# Patient Record
Sex: FEMALE | Race: WHITE | Employment: OTHER | RURAL
[De-identification: names, ages, dates, MRNs, and addresses within clinical notes are randomized per-mention and may not be internally consistent; named-entity substitution may affect disease eponyms.]

---

## 2017-03-01 ENCOUNTER — OFFICE VISIT (OUTPATIENT)
Dept: FAMILY MEDICINE CLINIC | Age: 52
End: 2017-03-01

## 2017-03-01 VITALS
DIASTOLIC BLOOD PRESSURE: 76 MMHG | RESPIRATION RATE: 16 BRPM | BODY MASS INDEX: 29.51 KG/M2 | SYSTOLIC BLOOD PRESSURE: 127 MMHG | HEIGHT: 67 IN | HEART RATE: 79 BPM | OXYGEN SATURATION: 99 % | TEMPERATURE: 97.6 F | WEIGHT: 188 LBS

## 2017-03-01 DIAGNOSIS — B37.31 VAGINAL YEAST INFECTION: ICD-10-CM

## 2017-03-01 DIAGNOSIS — K59.00 CONSTIPATION, UNSPECIFIED CONSTIPATION TYPE: ICD-10-CM

## 2017-03-01 DIAGNOSIS — N89.8 VAGINAL ITCHING: ICD-10-CM

## 2017-03-01 DIAGNOSIS — R10.84 DIFFUSE ABDOMINAL PAIN: Primary | ICD-10-CM

## 2017-03-01 DIAGNOSIS — R52 BODY ACHES: ICD-10-CM

## 2017-03-01 DIAGNOSIS — R53.83 FATIGUE, UNSPECIFIED TYPE: ICD-10-CM

## 2017-03-01 DIAGNOSIS — R68.83 CHILLS (WITHOUT FEVER): ICD-10-CM

## 2017-03-01 DIAGNOSIS — R11.0 NAUSEA WITHOUT VOMITING: ICD-10-CM

## 2017-03-01 LAB
BILIRUB UR QL STRIP: NEGATIVE
GLUCOSE UR-MCNC: NEGATIVE MG/DL
KETONES P FAST UR STRIP-MCNC: NEGATIVE MG/DL
PH UR STRIP: 6 [PH] (ref 4.6–8)
PROT UR QL STRIP: NEGATIVE MG/DL
QUICKVUE INFLUENZA TEST: NEGATIVE
SP GR UR STRIP: 1.01 (ref 1–1.03)
UA UROBILINOGEN AMB POC: NORMAL (ref 0.2–1)
URINALYSIS CLARITY POC: CLEAR
URINALYSIS COLOR POC: YELLOW
URINE BLOOD POC: NEGATIVE
URINE LEUKOCYTES POC: NEGATIVE
URINE NITRITES POC: NEGATIVE
VALID INTERNAL CONTROL?: YES

## 2017-03-01 RX ORDER — FLUCONAZOLE 150 MG/1
150 TABLET ORAL DAILY
Qty: 1 TAB | Refills: 1 | Status: SHIPPED | OUTPATIENT
Start: 2017-03-01 | End: 2017-03-02

## 2017-03-01 NOTE — PROGRESS NOTES
Health Maintenance Due   Topic Date Due    Hepatitis C Screening  1965  Patient has not had this done, is willing to do    Pneumococcal 19-64 Medium Risk (1 of 1 - PPSV23)  6/14/1982   Patient has not had this done, wants to know if she really needs it    DTaP/Tdap/Td series (1 - Tdap) 06/14/1984  Patient will RTC for this when it is needed    PAP AKA CERVICAL CYTOLOGY  06/14/1984  Patient reports she had this done last year, with Rambo Araujo at [facility], will request records    [facility] MAMMOGRAM  06/14/2013  Patient reports she had this done within the last year at the [facility]    FOBT Q 1 YEAR AGE 50-75  06/14/2013  I have given patient the kit to take home and explained how to collect the specimen    INFLUENZA AGE 9 TO ADULT  08/01/2016  Patient reports she had this done at the [facility], request for records will be sent     Lani Denney RN

## 2017-03-01 NOTE — PROGRESS NOTES
Debbie Cummings is a 46 y.o. female who presents to the office today with the following:  Chief Complaint   Patient presents with    Abdominal Pain     lower abdomin pain, ? UTI       HPI  Suspects UTI vs yeast inf, or \"maybe the flu?\"   Stating \"I don't know I just don't feel good. \"  C/o vaginal itching w/o discharge or lesions x 2 days. Has had yeast inf before, sxs same. No STD concerns. Has been tx with otc Monistat, has helped a little for the itching but would like diff tx. She denies burning with urination, urgency, frequency, or hematuria. Says it only burned slightly with Monistat application. Also with some mild diffuse (slightly worse lower) abdominal discomfort, no worsening, constant. Reports some bilat lower back pain as well. Also with nausea w/o vomiting, some fatigue, a little achy all over. No fevers. Denies any URI sxs. No ST, cough, congestion, ear pain. Also denies any vomiting or diarrhea, but admits to some constipation that is new for her. Last BM was this morning, but was small amt of hard stool. No blood or mucus. Appetite wnl. Just returned from [place].   Did have flu shot. Brother is sick. Reports nl colonoscopy 3 yrs ago. Review of Systems   Constitutional: Positive for chills and malaise/fatigue. Negative for fever. HENT: Negative for ear pain and sore throat. Respiratory: Negative for cough and shortness of breath. Cardiovascular: Negative for chest pain. Gastrointestinal: Positive for abdominal pain and nausea. Negative for diarrhea and vomiting. Genitourinary: Positive for dysuria and flank pain. Negative for frequency, hematuria and urgency. Musculoskeletal: Negative for myalgias. Skin: Negative for rash. Neurological: Negative for headaches.        Allergies   Allergen Reactions    Augmentin [Amoxicillin-Pot Clavulanate] Nausea Only    Dilaudid [Hydromorphone (Bulk)] Other (comments)     Severe Headache    Keflex [Cephalexin] Nausea and Vomiting       Current Outpatient Prescriptions   Medication Sig    fluconazole (DIFLUCAN) 150 mg tablet Take 1 Tab by mouth daily for 1 day. FDA advises cautious prescribing of oral fluconazole in pregnancy. No current facility-administered medications for this visit. Past Medical History:   Diagnosis Date            No past surgical history on file. Social History     Social History    Marital status:      Spouse name: N/A    Number of children: N/A    Years of education: N/A     Social History Main Topics    Smoking status: Current Every Day Smoker     Packs/day: 0.25     Years: 10.00    Smokeless tobacco: Never Used    Alcohol use 1.0 oz/week     2 Glasses of wine per week    Drug use: No    Sexual activity: Yes     Partners: Female     Other Topics Concern    None     Social History Narrative       Family History   Problem Relation Age of Onset    Heart Disease Father     Hypertension Father     Cancer Father      skin cancer & breast cancer    Diabetes Father     Diabetes Maternal Grandmother     Cancer Mother          Physical Exam:  Visit Vitals    /76 (BP 1 Location: Left arm, BP Patient Position: Sitting)    Pulse 79    Temp 97.6 °F (36.4 °C) (Oral)    Resp 16    Ht 5' 7\" (1.702 m)    Wt 188 lb (85.3 kg)    SpO2 99%    BMI 29.44 kg/m2     Physical Exam   Constitutional: She is oriented to person, place, and time and well-developed, well-nourished, and in no distress. HENT:   Head: Normocephalic and atraumatic. Right Ear: External ear normal.   Left Ear: External ear normal.   Nose: Nose normal.   Mouth/Throat: Oropharynx is clear and moist.   Eyes: Conjunctivae and EOM are normal.   Neck: Normal range of motion. Neck supple. Cardiovascular: Normal rate, regular rhythm, normal heart sounds and intact distal pulses. Pulmonary/Chest: Effort normal and breath sounds normal. No respiratory distress. She has no decreased breath sounds. She has no wheezes. She has no rhonchi. She has no rales. Abdominal: Soft. Normal appearance. There is no hepatosplenomegaly. There is generalized tenderness. There is no rigidity, no rebound, no guarding and no CVA tenderness. Neg foot tap. No pain with passive LE manipulation. BS maybe just a little hypoactive. Musculoskeletal: Normal range of motion. Lymphadenopathy:     She has no cervical adenopathy. Neurological: She is alert and oriented to person, place, and time. Gait normal.   Skin: Skin is warm and dry. Psychiatric: Mood and affect normal.   Nursing note and vitals reviewed. Assessment/Plan:    ICD-10-CM ICD-9-CM    1. Diffuse abdominal pain R10.84 789.00 AMB POC URINALYSIS DIP STICK MANUAL W/O MICRO      CBC WITH AUTOMATED DIFF      METABOLIC PANEL, COMPREHENSIVE   2. Constipation, unspecified constipation type K59.00 564.00 - Rec fluids and Miralax, high fiber diet. - Suspect viral vs constipation as diffuse abd discomfort. 3. Fatigue, unspecified type R53.83 780.79 AMB POC RAPID INFLUENZA TEST   4. Nausea without vomiting R11.0 787.02 AMB POC RAPID INFLUENZA TEST   5. Chills (without fever) R68.83 780.64 AMB POC RAPID INFLUENZA TEST   6. Body aches R52 780.96 AMB POC RAPID INFLUENZA TEST   7. Vaginal itching L29.8 698.1 CULTURE, URINE      fluconazole (DIFLUCAN) 150 mg tablet   8. Vaginal yeast infection B37.3 112.1 fluconazole (DIFLUCAN) 150 mg tablet  - Defers today, but agrees to RTC for full pelvic exam if sxs persist/worsen.      Results for orders placed or performed in visit on 03/01/17   AMB POC URINALYSIS DIP STICK MANUAL W/O MICRO   Result Value Ref Range    Color (UA POC) Yellow     Clarity (UA POC) Clear     Glucose (UA POC) Negative Negative    Bilirubin (UA POC) Negative Negative    Ketones (UA POC) Negative Negative    Specific gravity (UA POC) 1.010 1.001 - 1.035    Blood (UA POC) Negative Negative    pH (UA POC) 6.0 4.6 - 8.0    Protein (UA POC) Negative Negative mg/dL    Urobilinogen (UA POC) 0.2 mg/dL 0.2 - 1    Nitrites (UA POC) Negative Negative    Leukocyte esterase (UA POC) Negative Negative   AMB POC RAPID INFLUENZA TEST   Result Value Ref Range    VALID INTERNAL CONTROL POC Yes     QuickVue Influenza test Negative Negative     Explained to pt suspect viral etiology + constipation. Urine culture also pending and will cover for yeast inf. Discussed plan with Dr. Freedom Levine who agrees. Counseled pt on \"red flag\" sxs and instructed to seek immediate med attn/to ER if any such sxs/concerns (eg severe/worsening abd pain, fever, vomiting, cp, sob). Pt verbalizes understanding and agrees with the plan. Will call with lab results. Follow-up Disposition:  Return if symptoms worsen or fail to improve.     Leotha Claude, PA-C

## 2017-03-01 NOTE — PATIENT INSTRUCTIONS
Abdominal Pain: Care Instructions  Your Care Instructions    Abdominal pain has many possible causes. Some aren't serious and get better on their own in a few days. Others need more testing and treatment. If your pain continues or gets worse, you need to be rechecked and may need more tests to find out what is wrong. You may need surgery to correct the problem. Don't ignore new symptoms, such as fever, nausea and vomiting, urination problems, pain that gets worse, and dizziness. These may be signs of a more serious problem. Your doctor may have recommended a follow-up visit in the next 8 to 12 hours. If you are not getting better, you may need more tests or treatment. The doctor has checked you carefully, but problems can develop later. If you notice any problems or new symptoms, get medical treatment right away. Follow-up care is a key part of your treatment and safety. Be sure to make and go to all appointments, and call your doctor if you are having problems. It's also a good idea to know your test results and keep a list of the medicines you take. How can you care for yourself at home? · Rest until you feel better. · To prevent dehydration, drink plenty of fluids, enough so that your urine is light yellow or clear like water. Choose water and other caffeine-free clear liquids until you feel better. If you have kidney, heart, or liver disease and have to limit fluids, talk with your doctor before you increase the amount of fluids you drink. · If your stomach is upset, eat mild foods, such as rice, dry toast or crackers, bananas, and applesauce. Try eating several small meals instead of two or three large ones. · Wait until 48 hours after all symptoms have gone away before you have spicy foods, alcohol, and drinks that contain caffeine. · Do not eat foods that are high in fat. · Avoid anti-inflammatory medicines such as aspirin, ibuprofen (Advil, Motrin), and naproxen (Aleve).  These can cause stomach upset. Talk to your doctor if you take daily aspirin for another health problem. When should you call for help? Call 911 anytime you think you may need emergency care. For example, call if:  · You passed out (lost consciousness). · You pass maroon or very bloody stools. · You vomit blood or what looks like coffee grounds. · You have new, severe belly pain. Call your doctor now or seek immediate medical care if:  · Your pain gets worse, especially if it becomes focused in one area of your belly. · You have a new or higher fever. · Your stools are black and look like tar, or they have streaks of blood. · You have unexpected vaginal bleeding. · You have symptoms of a urinary tract infection. These may include:  ¨ Pain when you urinate. ¨ Urinating more often than usual.  ¨ Blood in your urine. · You are dizzy or lightheaded, or you feel like you may faint. Watch closely for changes in your health, and be sure to contact your doctor if:  · You are not getting better after 1 day (24 hours). Where can you learn more? Go to http://[facility]I Physical Therapyrolo.info/. Enter Y577 in the search box to learn more about \"Abdominal Pain: Care Instructions. \"  Current as of: May 27, 2016  Content Version: 11.1  © 6141-6161 [employer]. Care instructions adapted under license by [employer] (which disclaims liability or warranty for this information). If you have questions about a medical condition or this instruction, always ask your healthcare professional. [place] [address] any warranty or liability for your use of this information. Constipation: Care Instructions  Your Care Instructions  Constipation means that you have a hard time passing stools (bowel movements). People pass stools from 3 times a day to once every 3 days. What is normal for you may be different. Constipation may occur with pain in the rectum and cramping.  The pain may get worse when you try to pass stools. Sometimes there are small amounts of bright red blood on toilet paper or the surface of stools. This is because of enlarged veins near the rectum (hemorrhoids). A few changes in your diet and lifestyle may help you avoid ongoing constipation. Your doctor may also prescribe medicine to help loosen your stool. Some medicines can cause constipation. These include pain medicines and antidepressants. Tell your doctor about all the medicines you take. Your doctor may want to make a medicine change to ease your symptoms. Follow-up care is a key part of your treatment and safety. Be sure to make and go to all appointments, and call your doctor if you are having problems. It's also a good idea to know your test results and keep a list of the medicines you take. How can you care for yourself at home? · Drink plenty of fluids, enough so that your urine is light yellow or clear like water. If you have kidney, heart, or liver disease and have to limit fluids, talk with your doctor before you increase the amount of fluids you drink. · Include high-fiber foods in your diet each day. These include fruits, vegetables, beans, and whole grains. · Get at least 30 minutes of exercise on most days of the week. Walking is a good choice. You also may want to do other activities, such as running, swimming, cycling, or playing tennis or team sports. · Take a fiber supplement, such as Citrucel or Metamucil, every day. Read and follow all instructions on the label. · Schedule time each day for a bowel movement. A daily routine may help. Take your time having your bowel movement. · Support your feet with a small step stool when you sit on the toilet. This helps flex your hips and places your pelvis in a squatting position. · Your doctor may recommend an over-the-counter laxative to relieve your constipation. Examples are Milk of Magnesia and MiraLax. Read and follow all instructions on the label.  Do not use laxatives on a long-term basis. When should you call for help? Call your doctor now or seek immediate medical care if:  · You have new or worse belly pain. · You have new or worse nausea or vomiting. · You have blood in your stools. Watch closely for changes in your health, and be sure to contact your doctor if:  · Your constipation is getting worse. · You do not get better as expected. Where can you learn more? Go to http://javy-rolo.info/. Enter [phone] in the search box to learn more about \"Constipation: Care Instructions. \"  Current as of: May 27, 2016  Content Version: 11.1  © 2187-7716 [employer]. Care instructions adapted under license by [employer] (which disclaims liability or warranty for this information). If you have questions about a medical condition or this instruction, always ask your healthcare professional. [place] [address] any warranty or liability for your use of this information. Nausea and Vomiting: Care Instructions  Your Care Instructions    When you are nauseated, you may feel weak and sweaty and notice a lot of saliva in your mouth. Nausea often leads to vomiting. Most of the time you do not need to worry about nausea and vomiting, but they can be signs of other illnesses. Two common causes of nausea and vomiting are stomach flu and food poisoning. Nausea and vomiting from viral stomach flu will usually start to improve within 24 hours. Nausea and vomiting from food poisoning may last from 12 to 48 hours. The doctor has checked you carefully, but problems can develop later. If you notice any problems or new symptoms, get medical treatment right away. Follow-up care is a key part of your treatment and safety. Be sure to make and go to all appointments, and call your doctor if you are having problems. It's also a good idea to know your test results and keep a list of the medicines you take.   How can you care for yourself at home? · To prevent dehydration, drink plenty of fluids, enough so that your urine is light yellow or clear like water. Choose water and other caffeine-free clear liquids until you feel better. If you have kidney, heart, or liver disease and have to limit fluids, talk with your doctor before you increase the amount of fluids you drink. · Rest in bed until you feel better. · When you are able to eat, try clear soups, mild foods, and liquids until all symptoms are gone for 12 to 48 hours. Other good choices include dry toast, crackers, cooked cereal, and gelatin dessert, such as Jell-O. When should you call for help? Call 911 anytime you think you may need emergency care. For example, call if:  · You passed out (lost consciousness). Call your doctor now or seek immediate medical care if:  · You have symptoms of dehydration, such as:  ¨ Dry eyes and a dry mouth. ¨ Passing only a little dark urine. ¨ Feeling thirstier than usual.  · You have new or worsening belly pain. · You have a new or higher fever. · You vomit blood or what looks like coffee grounds. Watch closely for changes in your health, and be sure to contact your doctor if:  · You have ongoing nausea and vomiting. · Your vomiting is getting worse. · Your vomiting lasts longer than 2 days. · You are not getting better as expected. Where can you learn more? Go to http://javy-rolo.info/. Enter 25 [id number] in the search box to learn more about \"Nausea and Vomiting: Care Instructions. \"  Current as of: May 27, 2016  Content Version: 11.1  © 8829-2385 [employer]. Care instructions adapted under license by [employer] (which disclaims liability or warranty for this information). If you have questions about a medical condition or this instruction, always ask your healthcare professional. [address] any warranty or liability for your use of this information.

## 2017-03-02 LAB
ALBUMIN SERPL-MCNC: 4.4 G/DL (ref 3.5–5.5)
ALBUMIN/GLOB SERPL: 1.8 {RATIO} (ref 1.1–2.5)
ALP SERPL-CCNC: 67 IU/L (ref 39–117)
ALT SERPL-CCNC: 11 IU/L (ref 0–32)
AST SERPL-CCNC: 12 IU/L (ref 0–40)
BASOPHILS # BLD AUTO: 0 X10E3/UL (ref 0–0.2)
BASOPHILS NFR BLD AUTO: 0 %
BILIRUB SERPL-MCNC: 0.3 MG/DL (ref 0–1.2)
BUN SERPL-MCNC: 17 MG/DL (ref 6–24)
BUN/CREAT SERPL: 16 (ref 9–23)
CALCIUM SERPL-MCNC: 9.3 MG/DL (ref 8.7–10.2)
CHLORIDE SERPL-SCNC: 103 MMOL/L (ref 96–106)
CO2 SERPL-SCNC: 27 MMOL/L (ref 18–29)
CREAT SERPL-MCNC: 1.07 MG/DL (ref 0.57–1)
EOSINOPHIL # BLD AUTO: 0.2 X10E3/UL (ref 0–0.4)
EOSINOPHIL NFR BLD AUTO: 2 %
ERYTHROCYTE [DISTWIDTH] IN BLOOD BY AUTOMATED COUNT: 13.6 % (ref 12.3–15.4)
GLOBULIN SER CALC-MCNC: 2.5 G/DL (ref 1.5–4.5)
GLUCOSE SERPL-MCNC: 50 MG/DL (ref 65–99)
HCT VFR BLD AUTO: 37.3 % (ref 34–46.6)
HGB BLD-MCNC: 12.4 G/DL (ref 11.1–15.9)
IMM GRANULOCYTES # BLD: 0 X10E3/UL (ref 0–0.1)
IMM GRANULOCYTES NFR BLD: 0 %
INTERPRETATION: NORMAL
LYMPHOCYTES # BLD AUTO: 2 X10E3/UL (ref 0.7–3.1)
LYMPHOCYTES NFR BLD AUTO: 28 %
MCH RBC QN AUTO: 28.7 PG (ref 26.6–33)
MCHC RBC AUTO-ENTMCNC: 33.2 G/DL (ref 31.5–35.7)
MCV RBC AUTO: 86 FL (ref 79–97)
MONOCYTES # BLD AUTO: 0.8 X10E3/UL (ref 0.1–0.9)
MONOCYTES NFR BLD AUTO: 12 %
NEUTROPHILS # BLD AUTO: 4.1 X10E3/UL (ref 1.4–7)
NEUTROPHILS NFR BLD AUTO: 58 %
PLATELET # BLD AUTO: 261 X10E3/UL (ref 150–379)
POTASSIUM SERPL-SCNC: 5.4 MMOL/L (ref 3.5–5.2)
PROT SERPL-MCNC: 6.9 G/DL (ref 6–8.5)
RBC # BLD AUTO: 4.32 X10E6/UL (ref 3.77–5.28)
SODIUM SERPL-SCNC: 144 MMOL/L (ref 134–144)
WBC # BLD AUTO: 7 X10E3/UL (ref 3.4–10.8)

## 2017-03-02 NOTE — PROGRESS NOTES
Pt notified of results, she reports feeling better today. Explained some borderline levels likely off due to pt not eating/drinking much yesterday. (disc with Dr. Keke Sanders who agrees)  Rec f/u with PCP for routine labs when well. May rtc prn.

## 2017-03-03 LAB — BACTERIA UR CULT: NO GROWTH

## 2017-04-04 ENCOUNTER — OFFICE VISIT (OUTPATIENT)
Dept: FAMILY MEDICINE CLINIC | Age: 52
End: 2017-04-04

## 2017-04-04 VITALS
OXYGEN SATURATION: 97 % | BODY MASS INDEX: 29.26 KG/M2 | TEMPERATURE: 97.7 F | RESPIRATION RATE: 14 BRPM | WEIGHT: 186.4 LBS | HEART RATE: 70 BPM | DIASTOLIC BLOOD PRESSURE: 77 MMHG | SYSTOLIC BLOOD PRESSURE: 118 MMHG | HEIGHT: 67 IN

## 2017-04-04 DIAGNOSIS — J06.9 URI WITH COUGH AND CONGESTION: Primary | ICD-10-CM

## 2017-04-04 DIAGNOSIS — J02.9 SORE THROAT: ICD-10-CM

## 2017-04-04 DIAGNOSIS — J04.0 LARYNGITIS: ICD-10-CM

## 2017-04-04 LAB
S PYO AG THROAT QL: NEGATIVE
VALID INTERNAL CONTROL?: YES

## 2017-04-04 RX ORDER — AZITHROMYCIN 250 MG/1
TABLET, FILM COATED ORAL
Qty: 6 TAB | Refills: 0 | Status: SHIPPED | OUTPATIENT
Start: 2017-04-04 | End: 2018-12-06 | Stop reason: ALTCHOICE

## 2017-04-04 NOTE — PROGRESS NOTES
Alejandrina Fung is a 46 y.o. female who presents to the office today with the following:  Chief Complaint   Patient presents with    Laryngitis     x 5 days, frequent head ache       HPI  Hoarse voice x 5 days. Only occasional mild ST \"just doesn't feel right\"  Coughed some phlegm this morning, grey this morn, bright yellow yesterday. Also with mild headache in the back, no neck pain/stiffness. Sneezing some, head congestion. No sinus pain/pressure, no ear pain. Has been taking allergy meds daily, Nasacort ns, mucinex and otc cough syrup. Warm salt water gargles and drinking hot tea with honey. Denies yelling/screaming prior to losing voice. No heartburn/reflux. Sick contacts at home and work. Has had flu shot. Review of Systems   Constitutional: Negative for chills, fever and malaise/fatigue. HENT: Positive for congestion and sore throat. Negative for ear pain. Eyes: Negative. Respiratory: Positive for cough and sputum production. Negative for shortness of breath and wheezing. Cardiovascular: Negative for chest pain. Gastrointestinal: Negative for abdominal pain, diarrhea, nausea and vomiting. Genitourinary: Negative. Musculoskeletal: Negative for myalgias. Skin: Negative for rash. Neurological: Positive for headaches. See HPI. Allergies   Allergen Reactions    Augmentin [Amoxicillin-Pot Clavulanate] Nausea Only    Dilaudid [Hydromorphone (Bulk)] Other (comments)     Severe Headache    Keflex [Cephalexin] Nausea and Vomiting       Current Outpatient Prescriptions   Medication Sig    azithromycin (ZITHROMAX) 250 mg tablet Take 2 tablets today, then take 1 tablet daily     No current facility-administered medications for this visit. Past Medical History:   Diagnosis Date            No past surgical history on file.     Social History     Social History    Marital status:      Spouse name: N/A    Number of children: N/A    Years of education: N/A Social History Main Topics    Smoking status: Current Every Day Smoker     Packs/day: 0.25     Years: 10.00    Smokeless tobacco: Never Used    Alcohol use 1.0 oz/week     2 Glasses of wine per week    Drug use: No    Sexual activity: Yes     Partners: Female     Other Topics Concern    None     Social History Narrative       Family History   Problem Relation Age of Onset    Heart Disease Father     Hypertension Father     Cancer Father      skin cancer & breast cancer    Diabetes Father     Diabetes Maternal Grandmother     Cancer Mother          Physical Exam:  Visit Vitals    /77 (BP 1 Location: Left arm, BP Patient Position: Sitting)    Pulse 70    Temp 97.7 °F (36.5 °C) (Oral)    Resp 14    Ht 5' 7\" (1.702 m)    Wt 186 lb 6.4 oz (84.6 kg)    SpO2 97%    BMI 29.19 kg/m2     Physical Exam   Constitutional: She is oriented to person, place, and time and well-developed, well-nourished, and in no distress. HENT:   Head: Normocephalic and atraumatic. Right Ear: Tympanic membrane, external ear and ear canal normal.   Left Ear: Tympanic membrane, external ear and ear canal normal.   Nose: Nose normal.   Mouth/Throat: Uvula is midline, oropharynx is clear and moist and mucous membranes are normal.   Minimal tendernes to diffuse sinus w/ palpation pt notes \"maybe a little\". Voice hoarse. Eyes: Conjunctivae are normal.   Neck: Normal range of motion. Neck supple. Cardiovascular: Normal rate, regular rhythm and normal heart sounds. Pulmonary/Chest: Effort normal and breath sounds normal. She has no decreased breath sounds. She has no wheezes. She has no rhonchi. She has no rales. Abdominal: Soft. There is no tenderness. Lymphadenopathy:     She has no cervical adenopathy. Neurological: She is alert and oriented to person, place, and time. Gait normal.   Skin: Skin is warm and dry.    Psychiatric: Mood and affect normal.   Nursing note and vitals reviewed. Assessment/Plan:    ICD-10-CM ICD-9-CM    1. URI with cough and congestion J06.9 465.9 azithromycin (ZITHROMAX) 250 mg tablet   2. Laryngitis J04.0 464.00 azithromycin (ZITHROMAX) 250 mg tablet   3. Sore throat J02.9 462 AMB POC RAPID STREP A      azithromycin (ZITHROMAX) 250 mg tablet     Results for orders placed or performed in visit on 04/04/17   AMB POC RAPID STREP A   Result Value Ref Range    VALID INTERNAL CONTROL POC Yes     Group A Strep Ag Negative Negative     Explained likely viral, hold abx and take if no improvement. Rec voice rest, continue warm fluids. Rest & fluids. Warm salt water gargles. Tylenol/nsaids prn for fever/discomfort. Counseled on otc meds for symptomatic relief. RTO if sxs persist/worsen or if develops any new sxs/concerns. To seek immediate care/to ER for any \"red flag\" sxs. Follow-up Disposition:  Return if symptoms worsen or fail to improve.     Sera Pablo PA-C

## 2017-04-04 NOTE — PATIENT INSTRUCTIONS
Laryngitis: Care Instructions  Your Care Instructions    Laryngitis is an inflammation of the voice box (larynx) that causes your voice to become raspy or hoarse. It can be short-lived or long-lasting. Most of the time, laryngitis comes on quickly and lasts as long as 2 weeks. It is caused by overuse, irritation, or infection of the vocal cords inside the larynx. Some of the most common causes are a cold, the flu, or allergies. Loud talking, shouting, cheering, or singing also can cause laryngitis. Stomach acid that backs up into the throat also can make you lose your voice. Resting your voice and taking other steps at home can help you get your voice back. Follow-up care is a key part of your treatment and safety. Be sure to make and go to all appointments, and call your doctor if you are having problems. It's also a good idea to know your test results and keep a list of the medicines you take. How can you care for yourself at home? · Follow your doctor's directions for treating the condition that caused you to lose your voice. If your doctor prescribed antibiotics, take them as directed. Do not stop taking them just because you feel better. You need to take the full course of antibiotics. · Before you use cough and cold medicines, check the label. They may not be safe for young children or for people with certain health problems. · Try to keep stomach acid from backing up into your throat. Do not eat just before bedtime. Reduce the amount of coffee and alcohol you drink, and eat healthy foods. Taking over-the-counter acid reducers can help when these steps are not enough. In some cases, you may need prescription medicine. · Rest your voice. You do not have to stop speaking, but use your voice as little as possible. Speak softly but do not whisper; whispering can bother your larynx more than speaking softly. Avoid talking on the telephone or trying to speak loudly. · Try not to clear your throat.  This can cause more irritation of your larynx. Take an over-the-counter cough suppressant (if your doctor recommends it) if you have a dry cough that does not produce mucus. · Do not smoke or allow others to smoke around you. If you need help quitting, talk to your doctor about stop-smoking programs and medicines. These can increase your chances of quitting for good. · Use a humidifier or vaporizer to add moisture to your bedroom. Humidity helps to thin the mucus in the nasal membranes that causes stuffiness or postnasal drip. Follow the directions for cleaning the machine. · Drink plenty of fluids, enough so that your urine is light yellow or clear like water. If you have kidney, heart, or liver disease and have to limit fluids, talk with your doctor before you increase the amount of fluids you drink. · Relieve nasal stuffiness. A saline (saltwater) nasal wash may help. You can buy saline nose drops at a grocery store or drugstore. Or you can make your own by adding 1 teaspoon of salt and 1 teaspoon of baking soda to 2 cups of distilled water. If you make your own, fill a bulb syringe with the solution, insert the tip into your nostril, and squeeze gently. Maryella Gaw your nose. When should you call for help? Call your doctor now or seek immediate medical care if:  · You have new or worse trouble breathing. · You have new pain, or your pain gets worse. · You have trouble swallowing. Watch closely for changes in your health, and be sure to contact your doctor if:  · Your voice does not get better after 2 weeks of care at home. Where can you learn more? Go to http://javy-rolo.info/. Enter I253 in the search box to learn more about \"Laryngitis: Care Instructions. \"  Current as of: July 29, 2016  Content Version: 11.2  © 6417-1902 [employer]. Care instructions adapted under license by "[employer]" (which disclaims liability or warranty for this information).  If you have questions about a medical condition or this instruction, always ask your healthcare professional. [address] any warranty or liability for your use of this information. Sore Throat: Care Instructions  Your Care Instructions    Infection by bacteria or a virus causes most sore throats. Cigarette smoke, dry air, air pollution, allergies, and yelling can also cause a sore throat. Sore throats can be painful and annoying. Fortunately, most sore throats go away on their own. If you have a bacterial infection, your doctor may prescribe antibiotics. Follow-up care is a key part of your treatment and safety. Be sure to make and go to all appointments, and call your doctor if you are having problems. It's also a good idea to know your test results and keep a list of the medicines you take. How can you care for yourself at home? · If your doctor prescribed antibiotics, take them as directed. Do not stop taking them just because you feel better. You need to take the full course of antibiotics. · Gargle with warm salt water once an hour to help reduce swelling and relieve discomfort. Use 1 teaspoon of salt mixed in 1 cup of warm water. · Take an over-the-counter pain medicine, such as acetaminophen (Tylenol), ibuprofen (Advil, Motrin), or naproxen (Aleve). Read and follow all instructions on the label. · Be careful when taking over-the-counter cold or flu medicines and Tylenol at the same time. Many of these medicines have acetaminophen, which is Tylenol. Read the labels to make sure that you are not taking more than the recommended dose. Too much acetaminophen (Tylenol) can be harmful. · Drink plenty of fluids. Fluids may help soothe an irritated throat. Hot fluids, such as tea or soup, may help decrease throat pain. · Use over-the-counter throat lozenges to soothe pain. Regular cough drops or hard candy may also help.  These should not be given to young children because of the risk of choking. · Do not smoke or allow others to smoke around you. If you need help quitting, talk to your doctor about stop-smoking programs and medicines. These can increase your chances of quitting for good. · Use a vaporizer or humidifier to add moisture to your bedroom. Follow the directions for cleaning the machine. When should you call for help? Call your doctor now or seek immediate medical care if:  · You have new or worse trouble swallowing. · Your sore throat gets much worse on one side. Watch closely for changes in your health, and be sure to contact your doctor if you do not get better as expected. Where can you learn more? Go to http://javyKnewCoinrolo.info/. Enter 062 [id number] in the search box to learn more about \"Sore Throat: Care Instructions. \"  Current as of: July 29, 2016  Content Version: 11.2  © 7149-6306 [employer]. Care instructions adapted under license by [employer] (which disclaims liability or warranty for this information). If you have questions about a medical condition or this instruction, always ask your healthcare professional. [place] [address] any warranty or liability for your use of this information. Upper Respiratory Infection (Cold): Care Instructions  Your Care Instructions    An upper respiratory infection, or URI, is an infection of the nose, sinuses, or throat. URIs are spread by coughs, sneezes, and direct contact. The common cold is the most frequent kind of URI. The flu and sinus infections are other kinds of URIs. Almost all URIs are caused by viruses. Antibiotics won't cure them. But you can treat most infections with home care. This may include drinking lots of fluids and taking over-the-counter pain medicine. You will probably feel better in 4 to 10 days. The doctor has checked you carefully, but problems can develop later.  If you notice any problems or new symptoms, get medical treatment right away.  Follow-up care is a key part of your treatment and safety. Be sure to make and go to all appointments, and call your doctor if you are having problems. It's also a good idea to know your test results and keep a list of the medicines you take. How can you care for yourself at home? · To prevent dehydration, drink plenty of fluids, enough so that your urine is light yellow or clear like water. Choose water and other caffeine-free clear liquids until you feel better. If you have kidney, heart, or liver disease and have to limit fluids, talk with your doctor before you increase the amount of fluids you drink. · Take an over-the-counter pain medicine, such as acetaminophen (Tylenol), ibuprofen (Advil, Motrin), or naproxen (Aleve). Read and follow all instructions on the label. · Before you use cough and cold medicines, check the label. These medicines may not be safe for young children or for people with certain health problems. · Be careful when taking over-the-counter cold or flu medicines and Tylenol at the same time. Many of these medicines have acetaminophen, which is Tylenol. Read the labels to make sure that you are not taking more than the recommended dose. Too much acetaminophen (Tylenol) can be harmful. · Get plenty of rest.  · Do not smoke or allow others to smoke around you. If you need help quitting, talk to your doctor about stop-smoking programs and medicines. These can increase your chances of quitting for good. When should you call for help? Call 911 anytime you think you may need emergency care. For example, call if:  · You have severe trouble breathing. Call your doctor now or seek immediate medical care if:  · You seem to be getting much sicker. · You have new or worse trouble breathing. · You have a new or higher fever. · You have a new rash.   Watch closely for changes in your health, and be sure to contact your doctor if:  · You have a new symptom, such as a sore throat, an earache, or sinus pain. · You cough more deeply or more often, especially if you notice more mucus or a change in the color of your mucus. · You do not get better as expected. Where can you learn more? Go to http://javy-rolo.info/. Enter E070 in the search box to learn more about \"Upper Respiratory Infection (Cold): Care Instructions. \"  Current as of: June 30, 2016  Content Version: 11.2  © 3462-3919 [employer]. Care instructions adapted under license by [employer] (which disclaims liability or warranty for this information). If you have questions about a medical condition or this instruction, always ask your healthcare professional. [address] any warranty or liability for your use of this information.

## 2021-06-14 ENCOUNTER — HOSPITAL ENCOUNTER (OUTPATIENT)
Dept: ULTRASOUND IMAGING | Age: 56
Discharge: HOME | End: 2021-06-14
Attending: INTERNAL MEDICINE
Payer: COMMERCIAL

## 2021-06-14 ENCOUNTER — HOSPITAL ENCOUNTER (OUTPATIENT)
Dept: LAB | Age: 56
Discharge: HOME | End: 2021-06-14
Payer: COMMERCIAL

## 2021-06-14 ENCOUNTER — TRANSCRIBE ORDER (OUTPATIENT)
Dept: SCHEDULING | Age: 56
End: 2021-06-14

## 2021-06-14 DIAGNOSIS — I44.7 COMPLETE LEFT BUNDLE BRANCH BLOCK: Primary | ICD-10-CM

## 2021-06-14 DIAGNOSIS — I44.7 LEFT BUNDLE-BRANCH BLOCK, UNSPECIFIED: ICD-10-CM

## 2021-06-14 LAB
BNP SERPL-MCNC: 80 PG/ML (ref 0–125)
CK MB CFR SERPL CALC: NORMAL % (ref 0–2.5)
CK MB SERPL-MCNC: <1 NG/ML (ref 5–25)
CK SERPL-CCNC: 91 U/L (ref 26–192)
ECHO AO ROOT DIAM: 2.84 CM
ECHO AV PEAK GRADIENT: 5.46 MMHG
ECHO AV PEAK VELOCITY: 116.82 CM/S
ECHO EST RA PRESSURE: 10 MMHG
ECHO LA MAJOR AXIS: 3.21 CM
ECHO LV E' SEPTAL VELOCITY: 10.39 CM/S
ECHO LV INTERNAL DIMENSION DIASTOLIC: 4.73 CM (ref 3.9–5.3)
ECHO LV INTERNAL DIMENSION SYSTOLIC: 3.26 CM
ECHO LV IVSD: 0.9 CM (ref 0.6–0.9)
ECHO LV MASS 2D: 147.4 G (ref 67–162)
ECHO LV POSTERIOR WALL DIASTOLIC: 0.93 CM (ref 0.6–0.9)
ECHO LVOT DIAM: 1.98 CM
ECHO MV A VELOCITY: 97.58 CM/S
ECHO MV E DECELERATION TIME (DT): 267.15 MS
ECHO MV E VELOCITY: 84.75 CM/S
ECHO MV E/A RATIO: 0.87
ECHO MV E/E' SEPTAL: 8.16
ECHO RIGHT VENTRICULAR SYSTOLIC PRESSURE (RVSP): 26.44 MMHG
ECHO TV REGURGITANT MAX VELOCITY: 201.06 CM/S
ECHO TV REGURGITANT PEAK GRADIENT: 16.44 MMHG
TROPONIN I SERPL-MCNC: <0.05 NG/ML

## 2021-06-14 PROCEDURE — 82553 CREATINE MB FRACTION: CPT

## 2021-06-14 PROCEDURE — 93306 TTE W/DOPPLER COMPLETE: CPT | Performed by: INTERNAL MEDICINE

## 2021-06-14 PROCEDURE — 84484 ASSAY OF TROPONIN QUANT: CPT

## 2021-06-14 PROCEDURE — 83880 ASSAY OF NATRIURETIC PEPTIDE: CPT

## 2021-06-14 PROCEDURE — 93306 TTE W/DOPPLER COMPLETE: CPT

## 2021-06-15 ENCOUNTER — HOSPITAL ENCOUNTER (OUTPATIENT)
Dept: NUCLEAR MEDICINE | Age: 56
Discharge: HOME | End: 2021-06-15
Payer: COMMERCIAL

## 2021-06-15 ENCOUNTER — HOSPITAL ENCOUNTER (OUTPATIENT)
Dept: NON INVASIVE DIAGNOSTICS | Age: 56
Discharge: HOME | End: 2021-06-15
Payer: COMMERCIAL

## 2021-06-15 DIAGNOSIS — I44.7 COMPLETE LEFT BUNDLE BRANCH BLOCK: ICD-10-CM

## 2021-06-15 LAB
STRESS BASELINE DIAS BP: 90 MMHG
STRESS BASELINE HR: 58 BPM
STRESS BASELINE SYS BP: 156 MMHG
STRESS ESTIMATED WORKLOAD: 1 METS
STRESS EXERCISE DUR MIN: NORMAL
STRESS O2 SAT PEAK: 100 %
STRESS O2 SAT REST: 99 %
STRESS PEAK DIAS BP: 76 MMHG
STRESS PEAK SYS BP: 178 MMHG
STRESS PERCENT HR ACHIEVED: 68 %
STRESS POST PEAK HR: 110 BPM
STRESS RATE PRESSURE PRODUCT: NORMAL BPM*MMHG
STRESS TARGET HR: 162 BPM

## 2021-06-15 PROCEDURE — 78452 HT MUSCLE IMAGE SPECT MULT: CPT

## 2021-06-15 PROCEDURE — 74011250636 HC RX REV CODE- 250/636: Performed by: INTERNAL MEDICINE

## 2021-06-15 PROCEDURE — 93017 CV STRESS TEST TRACING ONLY: CPT

## 2021-06-15 RX ORDER — TETRAKIS(2-METHOXYISOBUTYLISOCYANIDE)COPPER(I) TETRAFLUOROBORATE 1 MG/ML
10 INJECTION, POWDER, LYOPHILIZED, FOR SOLUTION INTRAVENOUS
Status: COMPLETED | OUTPATIENT
Start: 2021-06-15 | End: 2021-06-15

## 2021-06-15 RX ORDER — SODIUM CHLORIDE 0.9 % (FLUSH) 0.9 %
10 SYRINGE (ML) INJECTION AS NEEDED
Status: DISCONTINUED | OUTPATIENT
Start: 2021-06-15 | End: 2021-06-19 | Stop reason: HOSPADM

## 2021-06-15 RX ORDER — TETRAKIS(2-METHOXYISOBUTYLISOCYANIDE)COPPER(I) TETRAFLUOROBORATE 1 MG/ML
30 INJECTION, POWDER, LYOPHILIZED, FOR SOLUTION INTRAVENOUS
Status: COMPLETED | OUTPATIENT
Start: 2021-06-15 | End: 2021-06-15

## 2021-06-15 RX ADMIN — TETRAKIS(2-METHOXYISOBUTYLISOCYANIDE)COPPER(I) TETRAFLUOROBORATE 30 MILLICURIE: 1 INJECTION, POWDER, LYOPHILIZED, FOR SOLUTION INTRAVENOUS at 09:30

## 2021-06-15 RX ADMIN — REGADENOSON 0.4 MG: 0.08 INJECTION, SOLUTION INTRAVENOUS at 09:34

## 2021-06-15 RX ADMIN — TETRAKIS(2-METHOXYISOBUTYLISOCYANIDE)COPPER(I) TETRAFLUOROBORATE 10 MILLICURIE: 1 INJECTION, POWDER, LYOPHILIZED, FOR SOLUTION INTRAVENOUS at 07:50

## 2021-06-15 RX ADMIN — Medication 10 ML: at 09:34

## 2021-06-16 ENCOUNTER — OFFICE VISIT (OUTPATIENT)
Dept: CARDIOLOGY CLINIC | Age: 56
End: 2021-06-16
Payer: COMMERCIAL

## 2021-06-16 VITALS
RESPIRATION RATE: 18 BRPM | TEMPERATURE: 97.8 F | HEIGHT: 67 IN | WEIGHT: 194 LBS | BODY MASS INDEX: 30.45 KG/M2 | SYSTOLIC BLOOD PRESSURE: 120 MMHG | HEART RATE: 77 BPM | DIASTOLIC BLOOD PRESSURE: 80 MMHG | OXYGEN SATURATION: 98 %

## 2021-06-16 DIAGNOSIS — R07.2 PRECORDIAL PAIN: Primary | ICD-10-CM

## 2021-06-16 DIAGNOSIS — R94.39 ABNORMAL NUCLEAR STRESS TEST: ICD-10-CM

## 2021-06-16 DIAGNOSIS — I44.7 LBBB (LEFT BUNDLE BRANCH BLOCK): ICD-10-CM

## 2021-06-16 PROCEDURE — 99204 OFFICE O/P NEW MOD 45 MIN: CPT | Performed by: INTERNAL MEDICINE

## 2021-06-16 PROCEDURE — 93000 ELECTROCARDIOGRAM COMPLETE: CPT | Performed by: INTERNAL MEDICINE

## 2021-06-16 RX ORDER — ESTRADIOL 10 UG/1
10 INSERT VAGINAL DAILY
COMMUNITY

## 2021-06-16 RX ORDER — ASPIRIN 325 MG
325 TABLET, DELAYED RELEASE (ENTERIC COATED) ORAL DAILY
COMMUNITY
End: 2021-06-16 | Stop reason: DRUGHIGH

## 2021-06-16 RX ORDER — METOPROLOL SUCCINATE 50 MG/1
50 TABLET, EXTENDED RELEASE ORAL DAILY
Qty: 90 TABLET | Refills: 1 | Status: SHIPPED | OUTPATIENT
Start: 2021-06-16 | End: 2021-06-22

## 2021-06-16 RX ORDER — MINERAL OIL
180 ENEMA (ML) RECTAL DAILY
COMMUNITY

## 2021-06-16 RX ORDER — ISOSORBIDE MONONITRATE 30 MG/1
30 TABLET, EXTENDED RELEASE ORAL DAILY
Qty: 90 TABLET | Refills: 1 | Status: SHIPPED | OUTPATIENT
Start: 2021-06-16 | End: 2021-06-22

## 2021-06-16 RX ORDER — ASPIRIN 81 MG/1
81 TABLET ORAL DAILY
Qty: 90 TABLET | Refills: 1
Start: 2021-06-16

## 2021-06-16 RX ORDER — ATORVASTATIN CALCIUM 20 MG/1
20 TABLET, FILM COATED ORAL
Qty: 90 TABLET | Refills: 1 | Status: SHIPPED | OUTPATIENT
Start: 2021-06-16

## 2021-06-16 NOTE — PERIOP NOTES
Patient denied any COVID-19 symptoms or possible exposure that would require a pre-procedural COVID-19 test for the Cath Lab procedure scheduled on 6/22/21    Patient is fully vaccinated (post 2 weeks from last dose) for covid-19. Patient instructed to bring original vaccine card on day of procedure. Patient will be tested on day of procedure if no vaccine card is produced.

## 2021-06-16 NOTE — PROGRESS NOTES
Identified pt with two pt identifiers(name and ). Reviewed record in preparation for visit and have obtained necessary documentation. Chief Complaint   Patient presents with    New Patient     Referred by Dr. Phoebe Penn for abn NMST & LBBB      Vitals:    21 1312   BP: 120/80   Pulse: 77   Resp: 18   Temp: 97.8 °F (36.6 °C)   TempSrc: Temporal   SpO2: 98%   Weight: 194 lb (88 kg)   Height: 5' 7\" (1.702 m)   PainSc:   0 - No pain       Medications reviewed/approved by Dr. Mary Cochran. Health Maintenance Review: Patient reminded of \"due or due soon\" health maintenance. I have asked the patient to contact his/her primary care provider (PCP) for follow-up on his/her health maintenance. Coordination of Care Questionnaire:  :   1) Have you been to an emergency room, urgent care, or hospitalized since your last visit? If yes, where when, and reason for visit? no       2. Have seen or consulted any other health care provider since your last visit? If yes, where when, and reason for visit? NO      Patient is accompanied by spouse I have received verbal consent from Rei Wright to discuss any/all medical information while they are present in the room.

## 2021-06-16 NOTE — PROGRESS NOTES
Yandel Guadarrama is a 60 y.o. female referred for cardiac evaluation. Seen by Dr. Luis Manuel Kee 21 with episode of SSCP one week prior, lasting about 2 hrs, some shoulder pain. EKG at PCP office with LBBB (newly recognized). No prior known cardiac hx. CV risks tobacco, +FH. No hx DM, hypertension. Prior lipids  with chol 189, RDB345. HDL 50, TG 59.  Had cardiac markers setn 21--trop neg < .05. ECHO 21 with LVEF 34-00, grade I diastolic, valves ok. LEXISCAN MPI 6/15/21 with LBBB, anteroseptal partially reversible defect/thinning c/w infarct/ischemia, LVEF 58%. No recurrence of CP since, felt mild tightness yesterday. Now taking ASA . Quit smoking on . The patient denies  shortness of breath, orthopnea, PND, LE edema, palpitations, syncope, presyncope or fatigue.        Patient Active Problem List    Diagnosis Date Noted    Precordial pain 2021    LBBB (left bundle branch block) 2021    Abnormal nuclear stress test 2021    Smoker 2015    Obesity 2015    Anxiety disorder 2011      Funmi Feng MD  Past Medical History:   Diagnosis Date    Abnormal nuclear stress test 2021    Diverticulosis          History of colonic polyps     HSV-1 infection     LBBB (left bundle branch block) 2021    Precordial pain 2021    Radial head fracture 2013    right      Past Surgical History:   Procedure Laterality Date    HX GYN      had a reflux kidney procedure    HX ORTHOPAEDIC      right knee replacement    HX ORTHOPAEDIC  2013    right raadial head fx     Allergies   Allergen Reactions    Augmentin [Amoxicillin-Pot Clavulanate] Nausea Only    Azithromycin Unknown (comments)    Dilaudid [Hydromorphone (Bulk)] Other (comments)     Severe Headache    Keflex [Cephalexin] Nausea and Vomiting      Family History   Problem Relation Age of Onset    Heart Disease Father     Hypertension Father     Cancer Father skin cancer & breast cancer    Diabetes Father     Diabetes Maternal Grandmother     Cancer Mother       Social History     Socioeconomic History    Marital status:      Spouse name: Not on file    Number of children: Not on file    Years of education: Not on file    Highest education level: Not on file   Occupational History    Not on file   Tobacco Use    Smoking status: Current Some Day Smoker     Packs/day: 0.25     Years: 10.00     Pack years: 2.50    Smokeless tobacco: Never Used    Tobacco comment: quit on Sunday 6/13/21   Substance and Sexual Activity    Alcohol use: Yes     Alcohol/week: 1.7 standard drinks     Types: 2 Glasses of wine per week    Drug use: No    Sexual activity: Yes     Partners: Female   Other Topics Concern    Not on file   Social History Narrative    Not on file     Social Determinants of Health     Financial Resource Strain:     Difficulty of Paying Living Expenses:    Food Insecurity:     Worried About Running Out of Food in the Last Year:     [address] in the Last Year:    Transportation Needs:     Lack of Transportation (Medical):  Lack of Transportation (Non-Medical):    Physical Activity:     Days of Exercise per Week:     Minutes of Exercise per Session:    Stress:     Feeling of Stress :    Social Connections:     Frequency of Communication with Friends and Family:     Frequency of Social Gatherings with Friends and Family:     Attends Nondenominational Services:     Active Member of Clubs or Organizations:     Attends Club or Organization Meetings:     Marital Status:    Intimate Partner Violence:     Fear of Current or Ex-Partner:     Emotionally Abused:     Physically Abused:     Sexually Abused:       Current Outpatient Medications   Medication Sig    estradioL (Vagifem) 10 mcg tab vaginal tablet Insert 10 mcg into vagina daily.  calcium carbonate/vitamin D3 (CALCIUM-D PO) Take  by mouth.     fexofenadine (ALLEGRA) 180 mg tablet Take 180 mg by mouth daily.  ferrous fumarate/vit Bcomp,C (SUPER B COMPLEX PO) Take  by mouth.  aspirin delayed-release 325 mg tablet Take 325 mg by mouth daily.  ibuprofen/pseudoephedrine HCl (ADVIL COLD AND SINUS PO) Take  by mouth. (Patient not taking: Reported on 6/16/2021)    mv-mn/C/glutamin/lysin/rghf428 (AIRBORNE, ASCORBATE SODIUM, PO) Take  by mouth. (Patient not taking: Reported on 6/16/2021)     No current facility-administered medications for this visit. Facility-Administered Medications Ordered in Other Visits   Medication Dose Route Frequency    sodium chloride (NS) flush 10 mL  10 mL IntraVENous PRN         Review of Symptoms:    CONST  No weight change. No fever, chills, sweats    ENT No visual changes, URI sx, sore throat    CV  See HPI   RESP  No cough, or sputum, wheezing. Also see HPI   GI  No abdominal pain or change in bowel habits. No heartburn or dysphagia. No melena or rectal bleeding.   No dysuria, urgency, frequency, hematuria   MSKEL  No joint pain, swelling. No muscle pain. SKIN  No rash or lesions. NEURO  No headache, syncope, or seizure. No weakness, loss of sensation, or paresthesias. PSYCH  No low mood or depression  No anxiety. HE/LYMPH  No easy bruising, abnormal bleeding, or enlarged glands. Physical ExamPhysical Exam:    Visit Vitals  /80 (BP 1 Location: Left upper arm, BP Patient Position: Sitting, BP Cuff Size: Large adult)   Pulse 77   Temp 97.8 °F (36.6 °C) (Temporal)   Resp 18   Ht 5' 7\" (1.702 m)   Wt 194 lb (88 kg)   SpO2 98% Comment: ra   BMI 30.38 kg/m²     Gen: NAD  HEENT:  PERRL, throat clear  Neck: no adenopathy, no thyromegaly, no JVD   Heart:  Regular,Nl S1S2,  no murmur, gallop or rub. Lungs:  clear  Abdomen:   Soft, non-tender, bowel sounds are active.    Extremities:  No edema  Pulse: symmetric  Neuro: A&O times 3, No focal neuro deficits    Cardiographics    ECG: NSR, LBBB    Labs:   Lab Results   Component Value Date/Time    Sodium 141 08/09/2019 09:40 AM    Sodium 145 (H) 04/23/2019 01:13 PM    Sodium 144 03/01/2017 03:02 PM    Potassium 4.4 08/09/2019 09:40 AM    Potassium 4.7 04/23/2019 01:13 PM    Potassium 5.4 (H) 03/01/2017 03:02 PM    Chloride 103 08/09/2019 09:40 AM    Chloride 105 04/23/2019 01:13 PM    Chloride 103 03/01/2017 03:02 PM    CO2 22 08/09/2019 09:40 AM    CO2 26 04/23/2019 01:13 PM    CO2 27 03/01/2017 03:02 PM    Glucose 100 (H) 08/09/2019 09:40 AM    Glucose 96 04/23/2019 01:13 PM    Glucose 50 (L) 03/01/2017 03:02 PM    BUN 16 08/09/2019 09:40 AM    BUN 18 04/23/2019 01:13 PM    BUN 17 03/01/2017 03:02 PM    Creatinine 1.12 (H) 08/09/2019 09:40 AM    Creatinine 1.22 (H) 04/23/2019 01:13 PM    Creatinine 1.07 (H) 03/01/2017 03:02 PM    BUN/Creatinine ratio 14 08/09/2019 09:40 AM    BUN/Creatinine ratio 15 04/23/2019 01:13 PM    BUN/Creatinine ratio 16 03/01/2017 03:02 PM    GFR est AA 63 08/09/2019 09:40 AM    GFR est AA 58 (L) 04/23/2019 01:13 PM    GFR est AA 68 03/01/2017 03:02 PM    GFR est non-AA 55 (L) 08/09/2019 09:40 AM    GFR est non-AA 50 (L) 04/23/2019 01:13 PM    GFR est non-AA 59 (L) 03/01/2017 03:02 PM    Calcium 9.8 08/09/2019 09:40 AM    Calcium 9.5 04/23/2019 01:13 PM    Calcium 9.3 03/01/2017 03:02 PM    Bilirubin, total 0.3 04/23/2019 01:13 PM    Bilirubin, total 0.3 03/01/2017 03:02 PM    Alk. phosphatase 68 04/23/2019 01:13 PM    Alk.  phosphatase 67 03/01/2017 03:02 PM    Protein, total 6.9 04/23/2019 01:13 PM    Protein, total 6.9 03/01/2017 03:02 PM    Albumin 4.5 04/23/2019 01:13 PM    Albumin 4.4 03/01/2017 03:02 PM    A-G Ratio 1.9 04/23/2019 01:13 PM    A-G Ratio 1.8 03/01/2017 03:02 PM    ALT (SGPT) 12 04/23/2019 01:13 PM    ALT (SGPT) 11 03/01/2017 03:02 PM     Lab Results   Component Value Date/Time    CK 91 06/14/2021 09:30 AM     Lab Results   Component Value Date/Time    Cholesterol, total 189 04/23/2019 01:13 PM    HDL Cholesterol 50 04/23/2019 01:13 PM    LDL, calculated 127 (H) 04/23/2019 01:13 PM    Triglyceride 59 04/23/2019 01:13 PM     No results found for this or any previous visit. Assessment:         Patient Active Problem List    Diagnosis Date Noted    Precordial pain 06/16/2021    LBBB (left bundle branch block) 06/16/2021    Abnormal nuclear stress test 06/16/2021    Smoker 12/01/2015    Obesity 12/01/2015    Anxiety disorder 03/18/2011       Seen by Dr. Rolando Sheriff 6/13/21 with episode of SSCP one week prior, lasting about 2 hrs, some shoulder pain. EKG at PCP office with LBBB (newly recognized). No prior known cardiac hx. CV risks tobacco, +FH. No hx DM, hypertension. Prior lipids 2019 with chol 189, VOS128. HDL 50, TG 59.  Had cardiac markers setn 6/14/21--trop neg < .05. ECHO 6/14/21 with LVEF 14-89, grade I diastolic, valves ok. LEXISCAN MPI 6/15/21 with LBBB, anteroseptal partially reversible defect/thinning c/w infarct/ischemia, LVEF 58%. No recurrence of CP since      Plan:     Cardiac Cath/possible PCI--likely R radial approach, risks/benefits d/w pt (CPT 69772)  ASA 81  Metoprolol XL 25mg every day  Imdur 30mg every day  Atorvastatin 20mg qhs  Smoking discussed--has quit  Pre-cath labs/lipids  To ER if acute sx in interim     ADDENDUM:  CBC, CMP, LIPIDS from 6/14/21  MEDICINE [facility])  Reviewed and scanned--normal CBC, CMP with Cr 1.0/BUN 17.0, K 4.5, chol 193, , HDL 51.  TG 80  PT/INR can be checked \"stat\" on arrival to [facility] for cath    Tru Nieves MD

## 2021-06-16 NOTE — LETTER
6/16/2021 Patient: Arpita Lomeli YOB: 1965 Date of Visit: 6/16/2021 Mini Schwba MD 
[address] [place] [phone] Via Fax: [phone] Dear Mini Schwab MD, Thank you for referring Ms. Meagan Nur to [facility] for evaluation. My notes for this consultation are attached. If you have questions, please do not hesitate to call me. I look forward to following your patient along with you.  
 
 
Sincerely, 
 
Neetu Bolden MD

## 2021-06-17 ENCOUNTER — TELEPHONE (OUTPATIENT)
Dept: CARDIOLOGY CLINIC | Age: 56
End: 2021-06-17

## 2021-06-22 ENCOUNTER — HOSPITAL ENCOUNTER (OUTPATIENT)
Age: 56
Discharge: HOME | End: 2021-06-22
Attending: INTERNAL MEDICINE | Admitting: INTERNAL MEDICINE
Payer: COMMERCIAL

## 2021-06-22 VITALS
OXYGEN SATURATION: 97 % | BODY MASS INDEX: 30.13 KG/M2 | DIASTOLIC BLOOD PRESSURE: 71 MMHG | HEIGHT: 67 IN | HEART RATE: 56 BPM | WEIGHT: 192 LBS | SYSTOLIC BLOOD PRESSURE: 130 MMHG | RESPIRATION RATE: 18 BRPM | TEMPERATURE: 97.4 F

## 2021-06-22 DIAGNOSIS — I44.7 LBBB (LEFT BUNDLE BRANCH BLOCK): ICD-10-CM

## 2021-06-22 DIAGNOSIS — E66.9 OBESITY, UNSPECIFIED CLASSIFICATION, UNSPECIFIED OBESITY TYPE, UNSPECIFIED WHETHER SERIOUS COMORBIDITY PRESENT: ICD-10-CM

## 2021-06-22 DIAGNOSIS — I25.9 MYOCARDIAL ISCHEMIA: ICD-10-CM

## 2021-06-22 DIAGNOSIS — I20.9 ANGINA, CLASS III (HCC): ICD-10-CM

## 2021-06-22 DIAGNOSIS — R07.9 CHEST PAIN, UNSPECIFIED TYPE: ICD-10-CM

## 2021-06-22 DIAGNOSIS — R94.39 ABNORMAL NUCLEAR STRESS TEST: ICD-10-CM

## 2021-06-22 PROBLEM — Z98.890 S/P CARDIAC CATH: Status: ACTIVE | Noted: 2021-06-22

## 2021-06-22 PROCEDURE — 77030028837 HC SYR ANGI PWR INJ COEU -A: Performed by: INTERNAL MEDICINE

## 2021-06-22 PROCEDURE — 93458 L HRT ARTERY/VENTRICLE ANGIO: CPT | Performed by: INTERNAL MEDICINE

## 2021-06-22 PROCEDURE — 77030019698 HC SYR ANGI MDLON MRTM -A: Performed by: INTERNAL MEDICINE

## 2021-06-22 PROCEDURE — 77030019569 HC BND COMPR RAD TERU -B: Performed by: INTERNAL MEDICINE

## 2021-06-22 PROCEDURE — 99152 MOD SED SAME PHYS/QHP 5/>YRS: CPT | Performed by: INTERNAL MEDICINE

## 2021-06-22 PROCEDURE — 74011250636 HC RX REV CODE- 250/636: Performed by: INTERNAL MEDICINE

## 2021-06-22 PROCEDURE — 99153 MOD SED SAME PHYS/QHP EA: CPT | Performed by: INTERNAL MEDICINE

## 2021-06-22 PROCEDURE — 77030015766: Performed by: INTERNAL MEDICINE

## 2021-06-22 PROCEDURE — 77030004549 HC CATH ANGI DX PRF MRTM -A: Performed by: INTERNAL MEDICINE

## 2021-06-22 PROCEDURE — 77030010221 HC SPLNT WR POS TELE -B: Performed by: INTERNAL MEDICINE

## 2021-06-22 PROCEDURE — C1876 STENT, NON-COA/NON-COV W/DEL: HCPCS | Performed by: INTERNAL MEDICINE

## 2021-06-22 PROCEDURE — 77030008543 HC TBNG MON PRSS MRTM -A: Performed by: INTERNAL MEDICINE

## 2021-06-22 PROCEDURE — 74011000250 HC RX REV CODE- 250: Performed by: INTERNAL MEDICINE

## 2021-06-22 PROCEDURE — C1769 GUIDE WIRE: HCPCS | Performed by: INTERNAL MEDICINE

## 2021-06-22 PROCEDURE — C1894 INTRO/SHEATH, NON-LASER: HCPCS | Performed by: INTERNAL MEDICINE

## 2021-06-22 PROCEDURE — 74011000636 HC RX REV CODE- 636: Performed by: INTERNAL MEDICINE

## 2021-06-22 PROCEDURE — 2709999900 HC NON-CHARGEABLE SUPPLY

## 2021-06-22 RX ORDER — GLUCOSAMINE SULFATE 500 MG
TABLET ORAL
COMMUNITY

## 2021-06-22 RX ORDER — HEPARIN SODIUM 200 [USP'U]/100ML
INJECTION, SOLUTION INTRAVENOUS
Status: COMPLETED | OUTPATIENT
Start: 2021-06-22 | End: 2021-06-22

## 2021-06-22 RX ORDER — ACETAMINOPHEN 325 MG/1
650 TABLET ORAL
Status: DISCONTINUED | OUTPATIENT
Start: 2021-06-22 | End: 2021-06-22 | Stop reason: HOSPADM

## 2021-06-22 RX ORDER — MIDAZOLAM HYDROCHLORIDE 1 MG/ML
INJECTION, SOLUTION INTRAMUSCULAR; INTRAVENOUS AS NEEDED
Status: DISCONTINUED | OUTPATIENT
Start: 2021-06-22 | End: 2021-06-22 | Stop reason: HOSPADM

## 2021-06-22 RX ORDER — FENTANYL CITRATE 50 UG/ML
INJECTION, SOLUTION INTRAMUSCULAR; INTRAVENOUS AS NEEDED
Status: DISCONTINUED | OUTPATIENT
Start: 2021-06-22 | End: 2021-06-22 | Stop reason: HOSPADM

## 2021-06-22 RX ORDER — NALOXONE HYDROCHLORIDE 0.4 MG/ML
0.4 INJECTION, SOLUTION INTRAMUSCULAR; INTRAVENOUS; SUBCUTANEOUS AS NEEDED
Status: DISCONTINUED | OUTPATIENT
Start: 2021-06-22 | End: 2021-06-22 | Stop reason: HOSPADM

## 2021-06-22 RX ORDER — HEPARIN SODIUM 1000 [USP'U]/ML
INJECTION, SOLUTION INTRAVENOUS; SUBCUTANEOUS AS NEEDED
Status: DISCONTINUED | OUTPATIENT
Start: 2021-06-22 | End: 2021-06-22 | Stop reason: HOSPADM

## 2021-06-22 RX ORDER — SODIUM CHLORIDE 0.9 % (FLUSH) 0.9 %
5-40 SYRINGE (ML) INJECTION EVERY 8 HOURS
Status: DISCONTINUED | OUTPATIENT
Start: 2021-06-22 | End: 2021-06-22 | Stop reason: HOSPADM

## 2021-06-22 RX ORDER — VERAPAMIL HYDROCHLORIDE 2.5 MG/ML
INJECTION, SOLUTION INTRAVENOUS AS NEEDED
Status: DISCONTINUED | OUTPATIENT
Start: 2021-06-22 | End: 2021-06-22 | Stop reason: HOSPADM

## 2021-06-22 RX ORDER — SODIUM CHLORIDE 0.9 % (FLUSH) 0.9 %
5-40 SYRINGE (ML) INJECTION AS NEEDED
Status: DISCONTINUED | OUTPATIENT
Start: 2021-06-22 | End: 2021-06-22 | Stop reason: HOSPADM

## 2021-06-22 RX ORDER — LIDOCAINE HYDROCHLORIDE 10 MG/ML
INJECTION INFILTRATION; PERINEURAL AS NEEDED
Status: DISCONTINUED | OUTPATIENT
Start: 2021-06-22 | End: 2021-06-22 | Stop reason: HOSPADM

## 2021-06-22 NOTE — PROGRESS NOTES
Arpita Lomeli is recovering post-diagn[facility]. Right radial cath site dressing is CDI without swelling or bleeding. VSS. Rhythm SB. Arpita Lomeli denies complaints at this time. If recovery continues to progress without complication, discharge is planned for later today. ECHO results, cath results, EKG, and medications discussed at length with patient. Both she and her  had many questions regarding LBBB. She is a current everyday smoker, advised to quit. Follow-up scheduled with Dr. Rajendra Nava office. BB and Pierre DC'd, patient has HLD, high-intensity statin continued. Advised to also continue 81 mg PO ASA daily.      Lydia Ballard, CHRISTIAN  DNP, RN, AGACNP-BC

## 2021-06-22 NOTE — Clinical Note
TRANSFER - OUT REPORT:     Verbal report given to: Sommer Boo. Report consisted of patient's Situation, Background, Assessment and   Recommendations(SBAR). Opportunity for questions and clarification was provided. Patient transported with a Registered Nurse. [facility]

## 2021-06-22 NOTE — H&P
Office H&P noted, reviewed. Re-examined today and no interval change. Class III angina despite max anti-anginal tx, high risk abnormal stress test.  I discussed the risks and benefits of cardiac catheterization +/- PCI with the patient who expressed understanding and wishes to proceed.

## 2021-06-22 NOTE — DISCHARGE INSTRUCTIONS
[address], [place], [address] [facility]  [phone]        Patient ID:  Ibeth Vasquez  [id number]  7 y.o.  1965    Admit Date: 6/22/2021    Discharge Date: 6/22/2021     Admitting Physician: Nikky Owen MD     Discharge Physician: Nikky Owen MD    Admission Diagnoses:   Chest pain, unspecified type [R07.9]    Discharge Diagnoses: Active Problems:    * No active hospital problems. *      Discharge Condition: Good    Cardiology Procedures this Admission:  Diagnostic left heart catheterization    Disposition: home    Reference discharge instructions provided by nursing for diet and activity. Signed:  Wayne Galan NP  6/22/2021  11:54 AM        Radial Cardiac Catheterization/Angiography Discharge Instructions    It is normal to feel tired the first couple days. Take it easy and follow the physicians instructions. CHECK THE CATHETER INSERTION SITE DAILY:    Remove the wrist dressing 24 hours after the procedure. You may shower 24 hours after the procedure. Wash with soap and water and pat dry. Gentle cleaning of the site with soap and water is sufficient, cover with a dry clean dressing or bandage. Do not apply creams or powders to the area. No soaking the wrist for 3 days. Leave the puncture site open to air after 24 hours post-procedure. CALL THE PHYSICIANS:     If the site becomes red, swollen or feels warm to the touch  If there is bleeding or drainage or if there is unusual pain at the radial site. If there is any minor oozing, you may apply a band-aid and remove after 12 hours. If the bleeding continues, hold pressure with the middle finger against the puncture site and the thumb against the back of the wrist,call 911 to be transported to the hospital.  DO NOT DRIVE YOURSELF, [facility]     ACTIVITY:   For the first 24 hours do not manipulate the wrist.  No lifting, pushing or pulling over 3-5 pounds with the affected wrist for 7 daysand no straining the insertion site. Do not life grocery bags or the garbage can, do not run the vacuum  or  for 7 days. Start with short walks as in the hospital and gradually increase as tolerated each day. It is recommended to walk 30 minutes 5-7 days per week. Follow your physicians instructions on activity. Avoid walking outside in extremes of heat or cold. Walk inside when it is cold and windy or hot and humid. Things to keep in mind:  No driving for at least 24 hours, or as designated by your physician. Limit the number of times you go up and down the stairs  Take rests and pace yourself with activity. Be careful and do not strain with bowel movements. MEDICATIONS:    Take all medications as prescribed  Call your physician if you have any questions  Keep an updated list of your medications with you at all times and give a list to your physician and pharmacist    SIGNS AND SYMPTOMS:   Be cautious of symptoms of angina or recurrent symptoms such as chest discomfort, unusual shortness of breath or fatigue. These could be symptoms of restenosis, a new blockage or a heart attack. If your symptoms are relieved with rest it is still recommended that you notify your physician of recurrent chest pain or discomfort. For CHEST PAIN or symptoms of angina not relieved with rest:  If the discomfort is not relieved with rest, and you have been prescribed Nitroglycerin, take as directed (taken under the tongue, one at a time 5 minutes apart for a total of 3 doses). If the discomfort is not relieved after the 3rd nitroglycerin, call 911. If you have not been prescribed Nitroglycerin  and your chest discomfort is not relieved with rest, call 911. AFTER CARE:   Follow up with your physician as instructed.   Follow a heart healthy diet with proper portion control, daily stress management, daily exercise, blood pressure and cholesterol control , and smoking cessation. Smoking Cessation Program: This is a free, phone/text/email based, smoking cessation program. The program is individualized to meet each patient's needs. To enroll use the link - [employer]. com/quit or text [employer] to [phone] from any smart phone.

## 2021-06-22 NOTE — CARDIO/PULMONARY
Cardiac Rehab Note: chart review/referral  
 
Pt is a 61 yo admitted with chest pain, s/p cardiac cath with non-significant CAD, no interventions performed. EF 60%  on 6/14/21 per echo. Smoking history assessed. Patient is a current smoker. Smoking Cessation Program link has not been added to the AVS. Patient not seen at this time due to current condition as indicated in chart.

## 2021-07-15 ENCOUNTER — OFFICE VISIT (OUTPATIENT)
Dept: CARDIOLOGY CLINIC | Age: 56
End: 2021-07-15
Payer: COMMERCIAL

## 2021-07-15 VITALS
HEART RATE: 60 BPM | HEIGHT: 67 IN | SYSTOLIC BLOOD PRESSURE: 120 MMHG | BODY MASS INDEX: 30.76 KG/M2 | TEMPERATURE: 96 F | RESPIRATION RATE: 18 BRPM | DIASTOLIC BLOOD PRESSURE: 70 MMHG | WEIGHT: 196 LBS | OXYGEN SATURATION: 97 %

## 2021-07-15 DIAGNOSIS — Z98.890 S/P CARDIAC CATH: ICD-10-CM

## 2021-07-15 DIAGNOSIS — I44.7 LBBB (LEFT BUNDLE BRANCH BLOCK): Primary | ICD-10-CM

## 2021-07-15 DIAGNOSIS — E78.5 DYSLIPIDEMIA: ICD-10-CM

## 2021-07-15 PROCEDURE — 99214 OFFICE O/P EST MOD 30 MIN: CPT | Performed by: INTERNAL MEDICINE

## 2021-07-15 NOTE — LETTER
7/15/2021    Patient: Yolanda Carrera   YOB: 1965   Date of Visit: 7/15/2021     Miguel Chang MD  [facility] [phone]  Via Fax: [phone]    Dear Miguel Chang MD,      Thank you for referring Ms. Kasey Estes to [facility] for evaluation. My notes for this consultation are attached. If you have questions, please do not hesitate to call me. I look forward to following your patient along with you.       Sincerely,    Norma Carpenter MD

## 2021-07-15 NOTE — PROGRESS NOTES
Cecilia Guerra is a 60 y.o. female is here for hospital f/u--s/p cardiac cath for LBBB, abnormal Stress MPI--normal coronaries, normal LV function. No sx. .  The patient denies chest pain/ shortness of breath, orthopnea, PND, LE edema, palpitations, syncope, presyncope or fatigue.        Patient Active Problem List    Diagnosis Date Noted    S/P cardiac cath 2021    Precordial pain 2021    LBBB (left bundle branch block) 2021    Abnormal nuclear stress test 2021    Smoker 2015    Obesity 2015    Anxiety disorder 2011      Funmi Feng MD  Past Medical History:   Diagnosis Date    Abnormal nuclear stress test 2021    Diverticulosis          History of colonic polyps     HSV-1 infection     LBBB (left bundle branch block) 2021    Precordial pain 2021    Radial head fracture 2013    right      Past Surgical History:   Procedure Laterality Date    HX GYN      had a reflux kidney procedure    HX ORTHOPAEDIC      right knee replacement    HX ORTHOPAEDIC  2013    right raadial head fx     Allergies   Allergen Reactions    Augmentin [Amoxicillin-Pot Clavulanate] Nausea Only    Azithromycin Unknown (comments)    Dilaudid [Hydromorphone (Bulk)] Other (comments)     Severe Headache    Keflex [Cephalexin] Nausea and Vomiting      Family History   Problem Relation Age of Onset    Heart Disease Father     Hypertension Father     Cancer Father         skin cancer & breast cancer    Diabetes Father     Diabetes Maternal Grandmother     Cancer Mother       Social History     Socioeconomic History    Marital status:      Spouse name: Not on file    Number of children: Not on file    Years of education: Not on file    Highest education level: Not on file   Occupational History    Not on file   Tobacco Use    Smoking status: Current Some Day Smoker     Packs/day: 0.25     Years: 10.00     Pack years: 2.50    Smokeless tobacco: Never Used    Tobacco comment: quit on Sunday 6/13/21   Substance and Sexual Activity    Alcohol use: Yes     Alcohol/week: 1.7 standard drinks     Types: 2 Glasses of wine per week    Drug use: No    Sexual activity: Yes     Partners: Female   Other Topics Concern    Not on file   Social History Narrative    Not on file     Social Determinants of Health     Financial Resource Strain:     Difficulty of Paying Living Expenses:    Food Insecurity:     Worried About Running Out of Food in the Last Year:     [address] in the Last Year:    Transportation Needs:     Lack of Transportation (Medical):  Lack of Transportation (Non-Medical):    Physical Activity:     Days of Exercise per Week:     Minutes of Exercise per Session:    Stress:     Feeling of Stress :    Social Connections:     Frequency of Communication with Friends and Family:     Frequency of Social Gatherings with Friends and Family:     Attends [employer] Services:     Active Member of Clubs or Organizations:     Attends Club or Organization Meetings:     Marital Status:    Intimate Partner Violence:     Fear of Current or Ex-Partner:     Emotionally Abused:     Physically Abused:     Sexually Abused:       Current Outpatient Medications   Medication Sig    lysine 1,000 mg tab Take  by mouth.  estradioL (Vagifem) 10 mcg tab vaginal tablet Insert 10 mcg into vagina daily.  calcium carbonate/vitamin D3 (CALCIUM-D PO) Take  by mouth.  fexofenadine (ALLEGRA) 180 mg tablet Take 180 mg by mouth daily.  ferrous fumarate/vit Bcomp,C (SUPER B COMPLEX PO) Take  by mouth.  atorvastatin (LIPITOR) 20 mg tablet Take 1 Tablet by mouth nightly.  aspirin delayed-release 81 mg tablet Take 1 Tablet by mouth daily. No current facility-administered medications for this visit. Review of Symptoms:    CONST  No weight change.  No fever, chills, sweats    ENT No visual changes, URI sx, sore throat    CV See HPI   RESP  No cough, or sputum, wheezing. Also see HPI   GI  No abdominal pain or change in bowel habits. No heartburn or dysphagia. No melena or rectal bleeding.   No dysuria, urgency, frequency, hematuria   MSKEL  No joint pain, swelling. No muscle pain. SKIN  No rash or lesions. NEURO  No headache, syncope, or seizure. No weakness, loss of sensation, or paresthesias. PSYCH  No low mood or depression  No anxiety. HE/LYMPH  No easy bruising, abnormal bleeding, or enlarged glands. Physical ExamPhysical Exam:    Visit Vitals  /70 (BP 1 Location: Left upper arm, BP Patient Position: Sitting, BP Cuff Size: Large adult)   Pulse 60   Temp (!) 96 °F (35.6 °C) (Temporal)   Resp 18   Ht 5' 7\" (1.702 m)   Wt 196 lb (88.9 kg)   SpO2 97% Comment: ra   BMI 30.70 kg/m²     Gen: NAD  HEENT:  PERRL, throat clear  Neck: no adenopathy, no thyromegaly, no JVD   Heart:  Regular,Nl S1S2,  no murmur, gallop or rub. Lungs:  clear  Abdomen:   Soft, non-tender, bowel sounds are active.    Extremities:  No edema  Pulse: symmetric  Neuro: A&O times 3, No focal neuro deficits    Cardiographics      Labs:   Lab Results   Component Value Date/Time    Sodium 141 08/09/2019 09:40 AM    Sodium 145 (H) 04/23/2019 01:13 PM    Sodium 144 03/01/2017 03:02 PM    Potassium 4.4 08/09/2019 09:40 AM    Potassium 4.7 04/23/2019 01:13 PM    Potassium 5.4 (H) 03/01/2017 03:02 PM    Chloride 103 08/09/2019 09:40 AM    Chloride 105 04/23/2019 01:13 PM    Chloride 103 03/01/2017 03:02 PM    CO2 22 08/09/2019 09:40 AM    CO2 26 04/23/2019 01:13 PM    CO2 27 03/01/2017 03:02 PM    Glucose 100 (H) 08/09/2019 09:40 AM    Glucose 96 04/23/2019 01:13 PM    Glucose 50 (L) 03/01/2017 03:02 PM    BUN 16 08/09/2019 09:40 AM    BUN 18 04/23/2019 01:13 PM    BUN 17 03/01/2017 03:02 PM    Creatinine 1.12 (H) 08/09/2019 09:40 AM    Creatinine 1.22 (H) 04/23/2019 01:13 PM    Creatinine 1.07 (H) 03/01/2017 03:02 PM    BUN/Creatinine ratio 14 08/09/2019 09:40 AM    BUN/Creatinine ratio 15 04/23/2019 01:13 PM    BUN/Creatinine ratio 16 03/01/2017 03:02 PM    GFR est AA 63 08/09/2019 09:40 AM    GFR est AA 58 (L) 04/23/2019 01:13 PM    GFR est AA 68 03/01/2017 03:02 PM    GFR est non-AA 55 (L) 08/09/2019 09:40 AM    GFR est non-AA 50 (L) 04/23/2019 01:13 PM    GFR est non-AA 59 (L) 03/01/2017 03:02 PM    Calcium 9.8 08/09/2019 09:40 AM    Calcium 9.5 04/23/2019 01:13 PM    Calcium 9.3 03/01/2017 03:02 PM    Bilirubin, total 0.3 04/23/2019 01:13 PM    Bilirubin, total 0.3 03/01/2017 03:02 PM    Alk. phosphatase 68 04/23/2019 01:13 PM    Alk. phosphatase 67 03/01/2017 03:02 PM    Protein, total 6.9 04/23/2019 01:13 PM    Protein, total 6.9 03/01/2017 03:02 PM    Albumin 4.5 04/23/2019 01:13 PM    Albumin 4.4 03/01/2017 03:02 PM    A-G Ratio 1.9 04/23/2019 01:13 PM    A-G Ratio 1.8 03/01/2017 03:02 PM    ALT (SGPT) 12 04/23/2019 01:13 PM    ALT (SGPT) 11 03/01/2017 03:02 PM     Lab Results   Component Value Date/Time    CK 91 06/14/2021 09:30 AM     Lab Results   Component Value Date/Time    Cholesterol, total 189 04/23/2019 01:13 PM    HDL Cholesterol 50 04/23/2019 01:13 PM    LDL, calculated 127 (H) 04/23/2019 01:13 PM    Triglyceride 59 04/23/2019 01:13 PM     No results found for this or any previous visit. Assessment:         Patient Active Problem List    Diagnosis Date Noted    S/P cardiac cath 06/22/2021    Precordial pain 06/16/2021    LBBB (left bundle branch block) 06/16/2021    Abnormal nuclear stress test 06/16/2021    Smoker 12/01/2015    Obesity 12/01/2015    Anxiety disorder 03/18/2011     -s/p cardiac cath for LBBB, abnormal Stress MPI--normal coronaries, normal LV function. No sx. Plan:     Doing well with no adverse cardiac symptoms. Ok to stop ASA (has already stopped metoprolol and imdur)  Continue atorvastatin for dyslipidemia  Lengthy discussion about LBBB   Lipids and labs followed by PCP.     Continue current care and f/u in 12months.     Mattie Grover MD

## 2021-07-15 NOTE — PROGRESS NOTES
Identified pt with two pt identifiers(name and ). Reviewed record in preparation for visit and have obtained necessary documentation. Chief Complaint   Patient presents with   [facility][1] Follow Up     Follow up post cardiac cath (normal coronaries)       Vitals:    07/15/21 1106   BP: 120/70   Pulse: 60   Resp: 18   Temp: (!) 96 °F (35.6 °C)   TempSrc: Temporal   SpO2: 97%   Weight: 196 lb (88.9 kg)   Height: 5' 7\" (1.702 m)   PainSc:   0 - No pain       Medications reviewed/approved by Dr. Yoshi Page. Health Maintenance Review: Patient reminded of \"due or due soon\" health maintenance. I have asked the patient to contact his/her primary care provider (PCP) for follow-up on his/her health maintenance. Coordination of Care Questionnaire:  :   1) Have you been to an emergency room, urgent care, or hospitalized since your last visit? If yes, where when, and reason for visit? 21 ED [facility] for cath. 2. Have seen or consulted any other health care provider since your last visit? If yes, where when, and reason for visit? YES - Dr. Salima Martin pcp seen since l/v. Patient is accompanied by spouse  have received verbal consent from Adelso Del Cid to discuss any/all medical information while they are present in the room.

## 2022-03-18 PROBLEM — Z98.890 S/P CARDIAC CATH: Status: ACTIVE | Noted: 2021-06-22

## 2022-03-18 PROBLEM — R94.39 ABNORMAL NUCLEAR STRESS TEST: Status: ACTIVE | Noted: 2021-06-16

## 2022-03-19 PROBLEM — I44.7 LBBB (LEFT BUNDLE BRANCH BLOCK): Status: ACTIVE | Noted: 2021-06-16

## 2022-03-20 PROBLEM — R07.2 PRECORDIAL PAIN: Status: ACTIVE | Noted: 2021-06-16

## 2023-05-20 RX ORDER — ESTRADIOL 10 UG/1
10 INSERT VAGINAL DAILY
COMMUNITY

## 2023-05-20 RX ORDER — ASPIRIN 81 MG/1
81 TABLET ORAL DAILY
COMMUNITY
Start: 2021-06-16

## 2023-05-20 RX ORDER — FEXOFENADINE HCL 180 MG/1
180 TABLET ORAL DAILY
COMMUNITY

## 2023-05-20 RX ORDER — ATORVASTATIN CALCIUM 20 MG/1
1 TABLET, FILM COATED ORAL NIGHTLY
COMMUNITY
Start: 2021-06-16

## (undated) DEVICE — KIT ANGIOGRAPHY CUST MRMC

## (undated) DEVICE — SYRINGE ANGIO 10 CC BRL STD PRNT POLYCARB LT BLU MEDALLION

## (undated) DEVICE — TUBING PRSS MON L6IN PVC M FEM CONN

## (undated) DEVICE — RADIFOCUS OPTITORQUE ANGIOGRAPHIC CATHETER: Brand: OPTITORQUE

## (undated) DEVICE — TR BAND RADIAL ARTERY COMPRESSION DEVICE: Brand: TR BAND

## (undated) DEVICE — CATHETER ANGIO JL3.5 AD 5 FRX100 CM PERFORMA

## (undated) DEVICE — GLIDESHEATH SLENDER ACCESS KIT: Brand: GLIDESHEATH SLENDER

## (undated) DEVICE — PACK PROCEDURE SURG HRT CATH

## (undated) DEVICE — GUIDEWIRE VASC L260CM 0.035IN J TIP L3MM PTFE FIX COR NAMIC

## (undated) DEVICE — SPLINT WR POS F/ARTERIAL ACC -- BX/10

## (undated) DEVICE — CATHETER ETER ANGIO L110CM OD5FR ID046IN L75CM 038IN 145DEG CARD

## (undated) DEVICE — HI-TORQUE VERSACORE FLOPPY GUIDE WIRE SYSTEM 145 CM: Brand: HI-TORQUE VERSACORE

## (undated) DEVICE — 3M™ TEGADERM™ TRANSPARENT FILM DRESSING FRAME STYLE, 1626W, 4 IN X 4-3/4 IN (10 CM X 12 CM), 50/CT 4CT/CASE: Brand: 3M™ TEGADERM™

## (undated) DEVICE — SYR POWER 150ML 8IN FILL TUBE --